# Patient Record
Sex: FEMALE | Race: WHITE | Employment: STUDENT | URBAN - METROPOLITAN AREA
[De-identification: names, ages, dates, MRNs, and addresses within clinical notes are randomized per-mention and may not be internally consistent; named-entity substitution may affect disease eponyms.]

---
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
## 2019-03-19 ENCOUNTER — TELEPHONE (OUTPATIENT)
Dept: ENDOCRINOLOGY | Facility: CLINIC | Age: 19
End: 2019-03-19

## 2019-03-19 NOTE — TELEPHONE ENCOUNTER
1st Attempt: Recall Letter sent on 03/11/2019.    2nd Attempt: LVM for Rabia to call back to schedule her routine follow up appointment with Malissa VITAL CNP.  Rabia is due to be seen in June 2019.  I asked Rabia to call [phone] to schedule this appointment.     Franky Young  Procedure , [place]  [facility]

## 2019-04-25 DIAGNOSIS — E06.3 HASHIMOTO'S DISEASE: ICD-10-CM

## 2019-04-25 RX ORDER — LEVOTHYROXINE SODIUM 100 UG/1
100 TABLET ORAL DAILY
Qty: 30 TABLET | Refills: 0 | Status: SHIPPED | OUTPATIENT
Start: 2019-04-25 | End: 2019-05-17 | Stop reason: DRUGHIGH

## 2019-04-25 NOTE — TELEPHONE ENCOUNTER
Called patient and left voicemail message to call clinic. This RN plans to discuss refill at this time and to remind patient to have thyroid labs drawn before upcoming appointment. [facility] pharmacy called and confirmed patient needed a refill of Levothyroxine. Refill was sent for 30 day supply to get patient to upcoming appointment with TAYLER Razo RN

## 2019-04-25 NOTE — TELEPHONE ENCOUNTER
levothyroxine (SYNTHROID/LEVOTHROID) 100 MCG tablet  Faxed refill request received from: [facility] [place]  Medication Requested: levothyroxine (SYNTHROID/LEVOTHROID) 100 MCG tablet  Directions: Take 1 tablet (100 mcg) by mouth daily - Oral  Quantity:90  Last Office Visit: 6/29/2018  Next Appointment Scheduled for: 5/17/2019   ATIYA Mejia

## 2019-05-17 ENCOUNTER — OFFICE VISIT (OUTPATIENT)
Dept: ENDOCRINOLOGY | Facility: CLINIC | Age: 19
End: 2019-05-17
Payer: COMMERCIAL

## 2019-05-17 VITALS
WEIGHT: 172.84 LBS | DIASTOLIC BLOOD PRESSURE: 78 MMHG | BODY MASS INDEX: 30.62 KG/M2 | SYSTOLIC BLOOD PRESSURE: 127 MMHG | HEART RATE: 85 BPM | HEIGHT: 63 IN

## 2019-05-17 DIAGNOSIS — E06.3 HASHIMOTO'S DISEASE: ICD-10-CM

## 2019-05-17 LAB
T4 FREE SERPL-MCNC: 1.12 NG/DL (ref 0.76–1.46)
TSH SERPL DL<=0.005 MIU/L-ACNC: 5.57 MU/L (ref 0.4–4)

## 2019-05-17 PROCEDURE — 84443 ASSAY THYROID STIM HORMONE: CPT | Performed by: NURSE PRACTITIONER

## 2019-05-17 PROCEDURE — 84439 ASSAY OF FREE THYROXINE: CPT | Performed by: NURSE PRACTITIONER

## 2019-05-17 PROCEDURE — 36416 COLLJ CAPILLARY BLOOD SPEC: CPT | Performed by: NURSE PRACTITIONER

## 2019-05-17 PROCEDURE — 99213 OFFICE O/P EST LOW 20 MIN: CPT | Performed by: NURSE PRACTITIONER

## 2019-05-17 RX ORDER — LEVOTHYROXINE SODIUM 100 UG/1
100 TABLET ORAL DAILY
Qty: 30 TABLET | Refills: 0 | Status: CANCELLED | OUTPATIENT
Start: 2019-05-17

## 2019-05-17 RX ORDER — LEVOTHYROXINE SODIUM 112 UG/1
112 TABLET ORAL DAILY
Qty: 30 TABLET | Refills: 11 | Status: SHIPPED | OUTPATIENT
Start: 2019-05-17 | End: 2020-05-15

## 2019-05-17 ASSESSMENT — MIFFLIN-ST. JEOR: SCORE: 1524.87

## 2019-05-17 NOTE — PROGRESS NOTES
Pediatric Endocrinology   Outpatient visit  HPI:  Rabia Jara is a 18 year old female here accompanied to clinic by her mother for follow up of hypothyroidism.    Past history is as follows: Rabia was first seen in consultation in 2012 by Dr. Salas after she was found to have elevated TSH in 2012-- initial TSH value was 97.95, free T4 0.46, and TPO of 997.32. She had symptoms for as long as 3 years prior to this. She had headaches, stomach aches and low energy and dry skin on her arms. Family history was positive for thyroid disease in father.     Rabia continues on levothyroxine at 100 mcg daily. Last dose increase at clinic visit 2018.      Missed doses since last visit:  Not having difficulties with missed doses-takes in morning.     Symptoms of hypo or hyperthyroidism:    Normal energy, no changes to skin or hair, no issues with abdominal pain, constipation.  Occasional issues waking at night.  Menses normal although now on OCPs for issues with cramping. Reporting intermittent joint pain.  Has area of psoriasis on back of neck.       I have reviewed the available past laboratory evaluations, imaging studies, and medical records available to me at this visit.  I have reviewed the patient's growth chart.    ROS:  General: Negative  Head:  Negative  Eyes:  Negative  Ears/Nose: Negative  OP/Throat:  Negative  Neck:  Negative:  CV:  Negative  Lungs:  Negative  Abdomen:  Negative  :  See HPI  Skin: Negative  MSK:  Negative  Heme/lymph: Negative  Neuro/psych: Negative  Endo:  Negative except as in HPI    PMHx:  Patient Active Problem List   Diagnosis     Hip dysplasia, congenital     Hashimoto's disease     Body mass index 85-94% for age, overweight child, prevention plus     Past Medical History:   Diagnosis Date     ALTE (apparent life threatening event) in  and infant 1 week old    Reflux     Hip dysplasia, congenital     1 more follow-up with Cirilo, but doing well      "Hypothyroidism Aug 2012    Hashimoto's thyroiditis     Past Surgical History:   Procedure Laterality Date     NO HISTORY OF SURGERY       SocHx:  Social History     Social History Narrative    Rabia lives at home with her parents () and brother Sharif.  They have 3 cats and a dog at home.  Dad smokes outside the home.  Rabia is in 11th grade (8113-3029).       FamHx:  Family History   Problem Relation Age of Onset     Hypertension Father      Cardiovascular Father      Circulatory Father         Brain Aneurysm     Thyroid Disease Father         Hypothyroidism     C.A.D. Maternal Grandfather      C.A.D. Paternal Grandfather      Endocrine Disease Paternal Grandmother         Short stature (5' 0)     Endocrine Disease Mother 14        late menarche     Asthma No family hx of      Diabetes No family hx of      Allergies Maternal Aunt        PE:  Vitals: /78 (BP Location: Left arm, Patient Position: Sitting, Cuff Size: Adult Large)   Pulse 85   Ht 1.595 m (5' 2.8\")   Wt 78.4 kg (172 lb 13.5 oz)   BMI 30.82 kg/m    BMI= Body mass index is 30.82 kg/m .  Last two heights and weights with percentiles:  Ht Readings from Last 2 Encounters:   05/17/19 1.595 m (5' 2.8\") (28 %)*   06/29/18 1.595 m (5' 2.8\") (28 %)*     * Growth percentiles are based on CDC (Girls, 2-20 Years) data.     Wt Readings from Last 2 Encounters:   05/17/19 78.4 kg (172 lb 13.5 oz) (93 %)*   06/29/18 68.2 kg (150 lb 5.7 oz) (83 %)*     * Growth percentiles are based on CDC (Girls, 2-20 Years) data.     General:  Alert, NAD.  HEENT:  NCAT. Conjunctiva clear, sclera white.  Oral mucosa without erythema or lesions.  Neck: Supple and non-tender  Thyroid:  No thyromegaly, no nodules  CV: RRR, normal S1 and S2, no murmur  Lungs: CTA b/l  Abd: soft, ND, no HSM  Skin: No rash  MSK: LEONARD  Neurologic:  Appropriate mental status for age.  Normal gross motor.  Psychiatric:  Cooperative.  Normal affect.      Results:    Results for orders placed or " performed in visit on 05/17/19   TSH   Result Value Ref Range    TSH 5.57 (H) 0.40 - 4.00 mU/L   T4 free   Result Value Ref Range    T4 Free 1.12 0.76 - 1.46 ng/dL       Assessment:  1.  Hashimoto's     Rabia is a 18 year old female with Hashimoto's that has been treated with levothyroxine.  Thyroid labs were performed today and TSH was mildly elevated with a normal Free T4.  Increase in levothyroxine to 112 mcg daily is recommended.  Follow up thyroid labs are needed in 4-6 weeks from dose change with lab only appointment.     Clinic follow up in 1 year is recommended with transition to adult endocrine.       Please refer to patient instructions for remainder of plan.      Plan:  Orders placed this encounter:  Orders Placed This Encounter   Procedures     TSH     T4 free       Patient Instructions   Thank you for choosing [facility]. It was a pleasure to see you for your office visit today.     To reach our Specialty Clinic: [phone]  To reach our Imaging scheduler: [phone]      If you had any blood work, imaging or other tests:  Normal test results will be mailed to your home address in a letter  Abnormal results will be communicated to you via phone call/letter  Please allow up to 1-2 weeks for processing/interpretation of most lab work  If you have questions or concerns call our clinic at [phone]    1.  Thyroid labs today.  I will be in contact with you when results are in and update pharmacy with refills on levothyroxine.    2.  Weight is up from last year.  Regular physical activity is encouraged.   3.  Concerns with intermittent joint pain.   I recommend discussing with your primary as a first step of evaluation.    4.  Follow up in 1 year with adult endocrine.       AMANUEL Razo, CNP  Pediatric Endocrinology  [facility]  [facility]  [phone]

## 2019-05-17 NOTE — LETTER
May 17, 2019      TO: Rabia Olivares Estefani  [address] [address]  [place] [address]         Dear Rabia,    Below are results from our endocrine visit:    Results for orders placed or performed in visit on 05/17/19   TSH   Result Value Ref Range    TSH 5.57 (H) 0.40 - 4.00 mU/L   T4 free   Result Value Ref Range    T4 Free 1.12 0.76 - 1.46 ng/dL     As I stated on my voicemail, your TSH was mildly elevated at 5.57 with a normal Free T4.  Based on higher TSH I recommend increase in your levothyroxine dosage to 112 mcg daily.  Follow up thyroid labs should be performed in 4-6 weeks from dose change.     Sincerely,    AMANUEL Razo, CNP  Pediatric Endocrinology  [facility]  [facility]  [phone]

## 2019-05-17 NOTE — NURSING NOTE
"Rabia Elise Estefani's goals for this visit include: Thyroid  She requests these members of her care team be copied on today's visit information: yes    PCP: Rhianna Miller    Referring Provider:  Rhianna Miller  [facility]  [address] [place] [address]    /78 (BP Location: Left arm, Patient Position: Sitting, Cuff Size: Adult Large)   Pulse 85   Ht 1.595 m (5' 2.8\")   Wt 78.4 kg (172 lb 13.5 oz)   BMI 30.82 kg/m      Do you need any medication refills at today's visit? No    SHAKILA Espinosa        "

## 2019-05-17 NOTE — LETTER
5/17/2019         RE: Rabia Jara  [address]  [place] [address]        Dear Colleague,    Thank you for referring your patient, Rabia Jara, to the [facility]. Please see a copy of my visit note below.    Pediatric Endocrinology   Outpatient visit  HPI:  Rabia Jara is a 18 year old female here accompanied to clinic by her mother for follow up of hypothyroidism.    Past history is as follows: Rabia was first seen in consultation in October 2012 by Dr. Salas after she was found to have elevated TSH in August 2012-- initial TSH value was 97.95, free T4 0.46, and TPO of 997.32. She had symptoms for as long as 3 years prior to this. She had headaches, stomach aches and low energy and dry skin on her arms. Family history was positive for thyroid disease in father.     Rabia continues on levothyroxine at 100 mcg daily. Last dose increase at clinic visit 6/29/2018.      Missed doses since last visit:  Not having difficulties with missed doses-takes in morning.     Symptoms of hypo or hyperthyroidism:    Normal energy, no changes to skin or hair, no issues with abdominal pain, constipation.  Occasional issues waking at night.  Menses normal although now on OCPs for issues with cramping. Reporting intermittent joint pain.  Has area of psoriasis on back of neck.       I have reviewed the available past laboratory evaluations, imaging studies, and medical records available to me at this visit.  I have reviewed the patient's growth chart.    ROS:  General: Negative  Head:  Negative  Eyes:  Negative  Ears/Nose: Negative  OP/Throat:  Negative  Neck:  Negative:  CV:  Negative  Lungs:  Negative  Abdomen:  Negative  :  See HPI  Skin: Negative  MSK:  Negative  Heme/lymph: Negative  Neuro/psych: Negative  Endo:  Negative except as in HPI    PMHx:  Patient Active Problem List   Diagnosis     Hip dysplasia, congenital     Hashimoto's disease     Body mass index 85-94% for  "age, overweight child, prevention plus     Past Medical History:   Diagnosis Date     ALTE (apparent life threatening event) in  and infant 1 week old    Reflux     Hip dysplasia, congenital     1 more follow-up with Cirilo, but doing well     Hypothyroidism Aug 2012    Hashimoto's thyroiditis     Past Surgical History:   Procedure Laterality Date     NO HISTORY OF SURGERY       SocHx:  Social History     Social History Narrative    Rabia lives at home with her parents () and brother Sharif.  They have 3 cats and a dog at home.  Dad smokes outside the home.  Rabia is in 11th grade (8631-0907).       FamHx:  Family History   Problem Relation Age of Onset     Hypertension Father      Cardiovascular Father      Circulatory Father         Brain Aneurysm     Thyroid Disease Father         Hypothyroidism     C.A.D. Maternal Grandfather      C.A.D. Paternal Grandfather      Endocrine Disease Paternal Grandmother         Short stature (5' 0)     Endocrine Disease Mother 14        late menarche     Asthma No family hx of      Diabetes No family hx of      Allergies Maternal Aunt        PE:  Vitals: /78 (BP Location: Left arm, Patient Position: Sitting, Cuff Size: Adult Large)   Pulse 85   Ht 1.595 m (5' 2.8\")   Wt 78.4 kg (172 lb 13.5 oz)   BMI 30.82 kg/m     BMI= Body mass index is 30.82 kg/m .  Last two heights and weights with percentiles:  Ht Readings from Last 2 Encounters:   19 1.595 m (5' 2.8\") (28 %)*   18 1.595 m (5' 2.8\") (28 %)*     * Growth percentiles are based on CDC (Girls, 2-20 Years) data.     Wt Readings from Last 2 Encounters:   19 78.4 kg (172 lb 13.5 oz) (93 %)*   18 68.2 kg (150 lb 5.7 oz) (83 %)*     * Growth percentiles are based on CDC (Girls, 2-20 Years) data.     General:  Alert, NAD.  HEENT:  NCAT. Conjunctiva clear, sclera white.  Oral mucosa without erythema or lesions.  Neck: Supple and non-tender  Thyroid:  No thyromegaly, no nodules  CV: " RRR, normal S1 and S2, no murmur  Lungs: CTA b/l  Abd: soft, ND, no HSM  Skin: No rash  MSK: LEONARD  Neurologic:  Appropriate mental status for age.  Normal gross motor.  Psychiatric:  Cooperative.  Normal affect.      Results:    Results for orders placed or performed in visit on 05/17/19   TSH   Result Value Ref Range    TSH 5.57 (H) 0.40 - 4.00 mU/L   T4 free   Result Value Ref Range    T4 Free 1.12 0.76 - 1.46 ng/dL       Assessment:  1.  Hashimoto's     Rabia is a 18 year old female with Hashimoto's that has been treated with levothyroxine.  Thyroid labs were performed today and TSH was mildly elevated with a normal Free T4.  Increase in levothyroxine to 112 mcg daily is recommended.  Follow up thyroid labs are needed in 4-6 weeks from dose change with lab only appointment.     Clinic follow up in 1 year is recommended with transition to adult endocrine.       Please refer to patient instructions for remainder of plan.      Plan:  Orders placed this encounter:  Orders Placed This Encounter   Procedures     TSH     T4 free       Patient Instructions   Thank you for choosing [facility]. It was a pleasure to see you for your office visit today.     To reach our Specialty Clinic: [phone]  To reach our Imaging scheduler: [phone]      If you had any blood work, imaging or other tests:  Normal test results will be mailed to your home address in a letter  Abnormal results will be communicated to you via phone call/letter  Please allow up to 1-2 weeks for processing/interpretation of most lab work  If you have questions or concerns call our clinic at [phone]    1.  Thyroid labs today.  I will be in contact with you when results are in and update pharmacy with refills on levothyroxine.    2.  Weight is up from last year.  Regular physical activity is encouraged.   3.  Concerns with intermittent joint pain.   I recommend discussing with your primary as a first step of evaluation.    4.   Follow up in 1 year with adult endocrine.       AMANUEL Razo, CNP  Pediatric Endocrinology  [facility]  [facility]  [phone]      Again, thank you for allowing me to participate in the care of your patient.        Sincerely,        AMANUEL Houston CNP

## 2019-05-17 NOTE — PATIENT INSTRUCTIONS
Thank you for choosing [facility]. It was a pleasure to see you for your office visit today.     To reach our Specialty Clinic: [phone]  To reach our Imaging scheduler: [phone]      If you had any blood work, imaging or other tests:  Normal test results will be mailed to your home address in a letter  Abnormal results will be communicated to you via phone call/letter  Please allow up to 1-2 weeks for processing/interpretation of most lab work  If you have questions or concerns call our clinic at [phone]    1.  Thyroid labs today.  I will be in contact with you when results are in and update pharmacy with refills on levothyroxine.    2.  Weight is up from last year.  Regular physical activity is encouraged.   3.  Concerns with intermittent joint pain.   I recommend discussing with your primary as a first step of evaluation.    4.  Follow up in 1 year with adult endocrine.

## 2019-05-21 ENCOUNTER — CARE COORDINATION (OUTPATIENT)
Dept: ENDOCRINOLOGY | Facility: CLINIC | Age: 19
End: 2019-05-21

## 2019-05-21 NOTE — PROGRESS NOTES
Patient mother called (JUAN on file) left voicemail message to return call to clinic regarding message from Malissa Padilla CNP. Patient also called and left voicemail message regarding results and recommendations. Letter will be sent to patient home address on file.  Tana Sun RN    Result letter recommendations per Malissa Padilla CNP:    As I stated on my voicemail, your TSH was mildly elevated at 5.57 with a normal Free T4.  Based on higher TSH I recommend increase in your levothyroxine dosage to 112 mcg daily.  Follow up thyroid labs should be performed in 4-6 weeks from dose change.          Update:  Spoke with patient regarding confirmation she received voicemail message left from Malissa Padilla CNP with dose change recommendations for Levothyroxine 112mcg daily with lab only appointment in 4-6 weeks from dose change. Patient verbally confirmed receiving message. She would like to call back to set up lab appointment after discussing with patient's mother. Will wait for callback from patient or patient's mother to assist in setting up lab only appointment;  Tana Sun RN

## 2020-05-15 DIAGNOSIS — E06.3 HASHIMOTO'S DISEASE: ICD-10-CM

## 2020-05-15 RX ORDER — LEVOTHYROXINE SODIUM 112 UG/1
112 TABLET ORAL DAILY
Qty: 30 TABLET | Refills: 1 | Status: SHIPPED | OUTPATIENT
Start: 2020-05-15 | End: 2020-07-14

## 2020-05-15 NOTE — TELEPHONE ENCOUNTER
Faxed refill request received from: [facility]  Medication Requested: levothyroxine (SYNTHROID/LEVOTHROID) 112 MCG tablet   Directions:Take 1 tablet (112 mcg) by mouth daily - Oral   Quantity:30 tablets  Last Office Visit: 05/17/2019  Next Appointment Scheduled for: 06/30/2020 with Dr. Huber  Last refill: 04/15/2020  SHAKILA Espinosa

## 2020-05-15 NOTE — TELEPHONE ENCOUNTER
Rx for levothyroxine sent to get patient to schedule appointment with adult endocrinology on 06/30/2020.   Tana Sun RN

## 2020-06-09 ENCOUNTER — TELEPHONE (OUTPATIENT)
Dept: ENDOCRINOLOGY | Facility: CLINIC | Age: 20
End: 2020-06-09

## 2020-06-09 NOTE — TELEPHONE ENCOUNTER
Patient request if provider would be willing to sign medical forms for unemployment due to COVID-19 and having an autoimmune disease. This RN will send message to Malissa Padilla CNP regarding request and contact patient with response.  Tana Sun RN

## 2020-06-17 NOTE — TELEPHONE ENCOUNTER
"Spoke with patient regarding request for letter and unemployment forms to be signed by Malissa Padilla CNP. At this time Malissa Padilla CNP will not sign these forms as CDC, endocrine society and the American thyroid association: the immune system is complex, and having autoimmune thyroid disease does not mean that a person is immunocompromised or will be unable to fight off a viral infection.     Thus far, there is no indication that patients with autoimmune thyroid disease are at greater risk of getting COVID-19 or of being more severely affected should they acquire the COVID-19 infection.     Everyone should continue to practice the recommended hand hygiene and social distancing recommendations to avoid COVID-19 infection.\"  I have to abide by current statements and recommendations from the JESSICA and CDC and I can't state that she is at higher risk with Hashimoto's hypothyroidism.  Rabia, if she had to return to work, should be allowed to wear a mask.     Patient verbalized understanding. No further questions or concerns.  Tana Sun RN    "

## 2020-07-14 DIAGNOSIS — E06.3 HASHIMOTO'S DISEASE: ICD-10-CM

## 2020-07-14 RX ORDER — LEVOTHYROXINE SODIUM 112 UG/1
112 TABLET ORAL DAILY
Qty: 30 TABLET | Refills: 0 | Status: SHIPPED | OUTPATIENT
Start: 2020-07-14 | End: 2020-08-14

## 2020-07-14 NOTE — TELEPHONE ENCOUNTER
Faxed refill request received from: [facility]   Pending Prescriptions:                       Disp   Refills    levothyroxine (SYNTHROID/LEVOTHROID) 112 *30 tab*1            Sig: Take 1 tablet (112 mcg) by mouth daily  Last Office Visit: 5/17/2019  Next Appointment Scheduled for: transitioning to adult endo-pt has an appt with Dr. Huber on 8/19/2020  Last refill: 6/15/2020  Jackie, CMA

## 2020-08-14 DIAGNOSIS — E06.3 HASHIMOTO'S DISEASE: ICD-10-CM

## 2020-08-14 RX ORDER — LEVOTHYROXINE SODIUM 112 UG/1
112 TABLET ORAL DAILY
Qty: 30 TABLET | Refills: 0 | Status: SHIPPED | OUTPATIENT
Start: 2020-08-14 | End: 2020-09-11

## 2020-08-14 NOTE — TELEPHONE ENCOUNTER
Faxed refill request received from: [facility]   Pending Prescriptions:                       Disp   Refills    levothyroxine (SYNTHROID/LEVOTHROID) 112 *30 tab*0            Sig: Take 1 tablet (112 mcg) by mouth daily  Quantity: 30  Last Office Visit: 5/17/2019  Next Appointment Scheduled for: Pt is transferring to adult endo. Pt has an appt with Dr. Huber on 8/19/2020.   Last refill: 7/14/2020  Jackie CMA

## 2020-08-14 NOTE — TELEPHONE ENCOUNTER
Per chart review, patient is transitioning to adult endocrinology on 8/19/2020 with Dr. Huber. Toyin refill x1 month provided to patient.    Marj Philippe RN  [facility]

## 2020-08-19 ENCOUNTER — OFFICE VISIT (OUTPATIENT)
Dept: ENDOCRINOLOGY | Facility: CLINIC | Age: 20
End: 2020-08-19
Payer: COMMERCIAL

## 2020-08-19 VITALS
BODY MASS INDEX: 36.27 KG/M2 | DIASTOLIC BLOOD PRESSURE: 85 MMHG | OXYGEN SATURATION: 98 % | WEIGHT: 203.4 LBS | SYSTOLIC BLOOD PRESSURE: 149 MMHG | HEART RATE: 111 BPM

## 2020-08-19 DIAGNOSIS — E06.3 HASHIMOTO'S DISEASE: Primary | ICD-10-CM

## 2020-08-19 DIAGNOSIS — R79.82 ELEVATED C-REACTIVE PROTEIN (CRP): ICD-10-CM

## 2020-08-19 DIAGNOSIS — E66.813 CLASS 3 SEVERE OBESITY DUE TO EXCESS CALORIES WITHOUT SERIOUS COMORBIDITY IN ADULT, UNSPECIFIED BMI (H): ICD-10-CM

## 2020-08-19 DIAGNOSIS — E66.01 CLASS 3 SEVERE OBESITY DUE TO EXCESS CALORIES WITHOUT SERIOUS COMORBIDITY IN ADULT, UNSPECIFIED BMI (H): ICD-10-CM

## 2020-08-19 LAB
ALBUMIN SERPL-MCNC: 3.8 G/DL (ref 3.4–5)
ALP SERPL-CCNC: 82 U/L (ref 40–150)
ALT SERPL W P-5'-P-CCNC: 16 U/L (ref 0–50)
ANION GAP SERPL CALCULATED.3IONS-SCNC: 3 MMOL/L (ref 3–14)
AST SERPL W P-5'-P-CCNC: 16 U/L (ref 0–45)
BASOPHILS # BLD AUTO: 0.1 10E9/L (ref 0–0.2)
BASOPHILS NFR BLD AUTO: 0.5 %
BILIRUB SERPL-MCNC: 0.2 MG/DL (ref 0.2–1.3)
BUN SERPL-MCNC: 9 MG/DL (ref 7–30)
CALCIUM SERPL-MCNC: 9.1 MG/DL (ref 8.5–10.1)
CHLORIDE SERPL-SCNC: 107 MMOL/L (ref 94–109)
CHOLEST SERPL-MCNC: 206 MG/DL
CO2 SERPL-SCNC: 27 MMOL/L (ref 20–32)
CREAT SERPL-MCNC: 0.61 MG/DL (ref 0.52–1.04)
CRP SERPL-MCNC: 24.4 MG/L (ref 0–8)
DIFFERENTIAL METHOD BLD: NORMAL
EOSINOPHIL # BLD AUTO: 0.2 10E9/L (ref 0–0.7)
EOSINOPHIL NFR BLD AUTO: 1.8 %
ERYTHROCYTE [DISTWIDTH] IN BLOOD BY AUTOMATED COUNT: 13.2 % (ref 10–15)
GFR SERPL CREATININE-BSD FRML MDRD: >90 ML/MIN/{1.73_M2}
GLUCOSE SERPL-MCNC: 85 MG/DL (ref 70–99)
HBA1C MFR BLD: 5.3 % (ref 0–5.6)
HCT VFR BLD AUTO: 40 % (ref 35–47)
HDLC SERPL-MCNC: 72 MG/DL
HGB BLD-MCNC: 12.9 G/DL (ref 11.7–15.7)
IMM GRANULOCYTES # BLD: 0 10E9/L (ref 0–0.4)
IMM GRANULOCYTES NFR BLD: 0.3 %
LDLC SERPL CALC-MCNC: 103 MG/DL
LYMPHOCYTES # BLD AUTO: 2.6 10E9/L (ref 0.8–5.3)
LYMPHOCYTES NFR BLD AUTO: 26.6 %
MCH RBC QN AUTO: 27.2 PG (ref 26.5–33)
MCHC RBC AUTO-ENTMCNC: 32.3 G/DL (ref 31.5–36.5)
MCV RBC AUTO: 84 FL (ref 78–100)
MONOCYTES # BLD AUTO: 0.4 10E9/L (ref 0–1.3)
MONOCYTES NFR BLD AUTO: 4.4 %
NEUTROPHILS # BLD AUTO: 6.4 10E9/L (ref 1.6–8.3)
NEUTROPHILS NFR BLD AUTO: 66.4 %
NONHDLC SERPL-MCNC: 134 MG/DL
PLATELET # BLD AUTO: 368 10E9/L (ref 150–450)
POTASSIUM SERPL-SCNC: 4.3 MMOL/L (ref 3.4–5.3)
PROT SERPL-MCNC: 8.3 G/DL (ref 6.8–8.8)
RBC # BLD AUTO: 4.74 10E12/L (ref 3.8–5.2)
SODIUM SERPL-SCNC: 137 MMOL/L (ref 133–144)
T4 FREE SERPL-MCNC: 1.22 NG/DL (ref 0.76–1.46)
TRIGL SERPL-MCNC: 157 MG/DL
TSH SERPL DL<=0.005 MIU/L-ACNC: 2.5 MU/L (ref 0.4–4)
WBC # BLD AUTO: 9.6 10E9/L (ref 4–11)

## 2020-08-19 PROCEDURE — 36415 COLL VENOUS BLD VENIPUNCTURE: CPT | Performed by: INTERNAL MEDICINE

## 2020-08-19 PROCEDURE — 86140 C-REACTIVE PROTEIN: CPT | Performed by: INTERNAL MEDICINE

## 2020-08-19 PROCEDURE — 80050 GENERAL HEALTH PANEL: CPT | Performed by: INTERNAL MEDICINE

## 2020-08-19 PROCEDURE — 80061 LIPID PANEL: CPT | Performed by: INTERNAL MEDICINE

## 2020-08-19 PROCEDURE — 84439 ASSAY OF FREE THYROXINE: CPT | Performed by: INTERNAL MEDICINE

## 2020-08-19 PROCEDURE — 83036 HEMOGLOBIN GLYCOSYLATED A1C: CPT | Performed by: INTERNAL MEDICINE

## 2020-08-19 PROCEDURE — 99204 OFFICE O/P NEW MOD 45 MIN: CPT | Performed by: INTERNAL MEDICINE

## 2020-08-19 NOTE — LETTER
8/19/2020         RE: Rabia Jara  [address] [address]        Dear Colleague,    Thank you for referring your patient, Rabia Jara, to the [facility]. Please see a copy of my visit note below.    The patient is transitioning care from pediatric endocrinology.  She was last seen in the clinic in May 2019.  HPI:  Rabia Jara is a 19 year old female.    Past history is as follows: Rabia was first seen in consultation in October 2012 by Dr. Salas after she was found to have elevated TSH in August 2012-- initial TSH value was 97.95, free T4 0.46, and TPO of 997.32. She had symptoms for as long as 3 years prior to this. She had headaches, stomach aches and low energy and dry skin on her arms. Family history was positive for thyroid disease in father.   In May 2019, the dose of levothyroxine was increased from 100 to 112 mcg daily, which she continues to take.    The patient reports being compliant in taking the levothyroxine in the morning, with water, 1 hour prior to having breakfast.  Intermittently, she has been taking multivitamins, generally 4 hours after taking levothyroxine.     She is frustrated by her inability to lose weight.   Menarche occurred at age 11 and her menstrual periods were monthly prior to starting treatment with birth control pills, in 2007, for management of abdominal cramps.  She describes being overweight as a child.  She dropped a lot of weight in high school and when she started treatment with levothyroxine but then she gained a significant amount of weight 1-1/2 years ago. Her weight is up 30 lbs since 5/2019 but stable in the last  Currently, the exercise consists of walks, on a daily basis, for 15 to 30 minutes and yoga/aerobics, for 30 to 40 minutes, 2 times a week.  She has a hard time identifying the main components of her diet.  She reports having sweets or sweetened beverages rarely, once a week or so.  In the  past, she has tried to reduce the caloric intake to 9111-9466 daily calories, with no success.    Approximately 1/2 years ago, she was evaluated by gynecology.  A pelvic ultrasound was done at that time and the recommendation was to take the birth control pills for 4 straight month.  While doing this, the patient gained weight and she did not feel good.  Subsequently, she continues to take them monthly.  Recently, her menstrual periods have been shorter, only lasting 2 days instead of 3 to 4 days.    For the last 2 to 3 years, she has been experiencing significant joint pain, mainly in the ankles, knees, hips and wrists.  She was evaluated by rheumatology and had x-rays done.  She plans to follow-up on this as the pain persists. Prior CRP levels have been elevated.       Current Outpatient Medications:      desogestrel-ethinyl estradiol (ENSKYCE) 0.15-30 MG-MCG per tablet, , Disp: , Rfl:      levothyroxine (SYNTHROID/LEVOTHROID) 112 MCG tablet, Take 1 tablet (112 mcg) by mouth daily, Disp: 30 tablet, Rfl: 0    ROS:  Systemic symptoms: no fatigue  Eye symptoms: No eye symptoms.  Otolaryngeal symptoms: No otolaryngeal symptoms.  Breast symptoms: No breast symptoms.  Cardiovascular symptoms: No cardiovascular symptoms.    Pulmonary symptoms: No pulmonary symptoms.  Gastrointestinal symptoms: No gastrointestinal symptoms.   Genitourinary symptoms: No genitourinary symptoms.  Endocrine symptoms: No endocrine symptoms.    Hematologic symptoms: No hematologic symptoms.  Musculoskeletal symptoms: as above   Neurological symptoms: headaches present more frequently (she is scheduled to have the wisdom teeth removed), now every other day; they are present around her MP periods: No muscle weakness  Psychological symptoms: she has noticed some anxiety, intermittently throughout her life  Skin symptoms: Easy bruising throughout her life    PMHx:  Past Medical History:   Diagnosis Date     ALTE (apparent life threatening event) in   and infant 1 week old    Reflux     Hip dysplasia, congenital     1 more follow-up with Cirilo, but doing well     Hypothyroidism Aug 2012    Hashimoto's thyroiditis   Psoriasis, limited now to the scalp area   Lactose intolerant     Past Surgical History:   Procedure Laterality Date     NO HISTORY OF SURGERY         SocHx:  Single. She lives with her parents. No children. She denies smoking, drinking alcohol or using illicit drugs. Occupation - used to work in retail.   Social History     Social History Narrative    Rabia lives at home with her parents () and brother Sharif.  They have 3 cats and a dog at home.  Dad smokes outside the home.  Rabia is in 11th grade (8954-2090).       FamHx:  Family History   Problem Relation Age of Onset     Hypertension Father      Cardiovascular Father      Circulatory Father         Brain Aneurysm     Thyroid Disease Father         Hypothyroidism     C.A.D. Maternal Grandfather      C.A.D. Paternal Grandfather      Endocrine Disease Paternal Grandmother         Short stature (5' 0)     Endocrine Disease Mother 14        late menarche     Asthma No family hx of      Diabetes No family hx of      Allergies Maternal Aunt    Family history of obesity on paternal side of the family. Great grandmother - ovarian cancer.     PE:  Wt Readings from Last 10 Encounters:   19 92.3 kg (203 lb 6.4 oz)   19 78.4 kg (172 lb 13.5 oz) (93 %, Z= 1.45)*   18 68.2 kg (150 lb 5.7 oz) (83 %, Z= 0.94)*   17 67.4 kg (148 lb 9.4 oz) (84 %, Z= 0.98)*   16 66.9 kg (147 lb 7.8 oz) (84 %, Z= 0.99)*   06/10/16 69.2 kg (152 lb 8.9 oz) (88 %, Z= 1.16)*   12/29/15 72.4 kg (159 lb 9.8 oz) (91 %, Z= 1.37)*   06/05/15 66.3 kg (146 lb 2.6 oz) (86 %, Z= 1.07)*   14 68.4 kg (150 lb 12.7 oz) (89 %, Z= 1.24)*   14 66 kg (145 lb 8 oz) (88 %, Z= 1.17)*     * Growth percentiles are based on CDC (Girls, 2-20 Years) data.     General appearance: she is  well-developed and in no distress.  General obesity.  Eyes: conjunctivae and extraocular motions are normal. Pupils are equal, round, and reactive to light. No lid lag, no stare.  HENT: oropharynx clear and moist; neck no JVD, no bruits, no thyromegaly, no palpable nodules  Cardiovascular: regular rhythm, no murmurs, distal pulses palpable, no edema  Respiratory: chest clear, no rales, no rhonchi  Gastrointestinal: abdomen soft, nontender, nondistended, +BS, no organomegaly  Musculoskeletal: normal tone and strength   Neurologic: reflexes normal and symmetric, no resting tremor  Psychiatric: affect and judgment normal   Skin: Purplish looking abdominal striae    Results:  TSH   Date Value Ref Range Status   05/17/2019 5.57 (H) 0.40 - 4.00 mU/L Final     T4 Free   Date Value Ref Range Status   05/17/2019 1.12 0.76 - 1.46 ng/dL Final     Assessment:    Rabia is a 19 year old female seen in follow-up for Hashimoto thyroiditis.    1.  Hashimoto thyroiditis.  Follow-up TFTs and adjust the dose of levothyroxine accordingly.    2.  Obesity  Counseled on the importance of diet and exercise.  I recommended to try to restrict caloric intake to 1400 solitraio.  Counseled on the importance of restricting processed foods, sweetened beverages.  Follow-up A1c, lipid panel and CMP    3.  Joint pain  Follow-up CRP.  The patient is planning to consult rheumatology.    Orders Placed This Encounter   Procedures     Lipid Profile     Hemoglobin A1c     TSH     T4 free     Comprehensive metabolic panel     CBC with platelets differential     CRP inflammation       Again, thank you for allowing me to participate in the care of your patient.        Sincerely,        Chelsea Huber MD

## 2020-08-19 NOTE — NURSING NOTE
Rabia Jara's goals for this visit include:   Chief Complaint   Patient presents with     Establish Care     Thyroid Disease     Hashimotos     She requests these members of her care team be copied on today's visit information: No    PCP: Alejo Rivers    Referring Provider:  No referring provider defined for this encounter.    BP (!) 149/85 (BP Location: Left arm, Patient Position: Sitting, Cuff Size: Adult Large)   Pulse 111   Wt 92.3 kg (203 lb 6.4 oz)   SpO2 98%   BMI 36.27 kg/m      Do you need any medication refills at today's visit? Yes

## 2020-08-19 NOTE — PROGRESS NOTES
The patient is transitioning care from pediatric endocrinology.  She was last seen in the clinic in May 2019.  HPI:  Rabia Jara is a 19 year old female.    Past history is as follows: aRbia was first seen in consultation in October 2012 by Dr. Salas after she was found to have elevated TSH in August 2012-- initial TSH value was 97.95, free T4 0.46, and TPO of 997.32. She had symptoms for as long as 3 years prior to this. She had headaches, stomach aches and low energy and dry skin on her arms. Family history was positive for thyroid disease in father.   In May 2019, the dose of levothyroxine was increased from 100 to 112 mcg daily, which she continues to take.    The patient reports being compliant in taking the levothyroxine in the morning, with water, 1 hour prior to having breakfast.  Intermittently, she has been taking multivitamins, generally 4 hours after taking levothyroxine.     She is frustrated by her inability to lose weight.   Menarche occurred at age 11 and her menstrual periods were monthly prior to starting treatment with birth control pills, in 2007, for management of abdominal cramps.  She describes being overweight as a child.  She dropped a lot of weight in high school and when she started treatment with levothyroxine but then she gained a significant amount of weight 1-1/2 years ago. Her weight is up 30 lbs since 5/2019 but stable in the last  Currently, the exercise consists of walks, on a daily basis, for 15 to 30 minutes and yoga/aerobics, for 30 to 40 minutes, 2 times a week.  She has a hard time identifying the main components of her diet.  She reports having sweets or sweetened beverages rarely, once a week or so.  In the past, she has tried to reduce the caloric intake to 1221-4245 daily calories, with no success.    Approximately 1/2 years ago, she was evaluated by gynecology.  A pelvic ultrasound was done at that time and the recommendation was to take the birth control pills  for 4 straight month.  While doing this, the patient gained weight and she did not feel good.  Subsequently, she continues to take them monthly.  Recently, her menstrual periods have been shorter, only lasting 2 days instead of 3 to 4 days.    For the last 2 to 3 years, she has been experiencing significant joint pain, mainly in the ankles, knees, hips and wrists.  She was evaluated by rheumatology and had x-rays done.  She plans to follow-up on this as the pain persists. Prior CRP levels have been elevated.       Current Outpatient Medications:      desogestrel-ethinyl estradiol (ENSKYCE) 0.15-30 MG-MCG per tablet, , Disp: , Rfl:      levothyroxine (SYNTHROID/LEVOTHROID) 112 MCG tablet, Take 1 tablet (112 mcg) by mouth daily, Disp: 30 tablet, Rfl: 0    ROS:  Systemic symptoms: no fatigue  Eye symptoms: No eye symptoms.  Otolaryngeal symptoms: No otolaryngeal symptoms.  Breast symptoms: No breast symptoms.  Cardiovascular symptoms: No cardiovascular symptoms.    Pulmonary symptoms: No pulmonary symptoms.  Gastrointestinal symptoms: No gastrointestinal symptoms.   Genitourinary symptoms: No genitourinary symptoms.  Endocrine symptoms: No endocrine symptoms.    Hematologic symptoms: No hematologic symptoms.  Musculoskeletal symptoms: as above   Neurological symptoms: headaches present more frequently (she is scheduled to have the wisdom teeth removed), now every other day; they are present around her MP periods: No muscle weakness  Psychological symptoms: she has noticed some anxiety, intermittently throughout her life  Skin symptoms: Easy bruising throughout her life    PMHx:  Past Medical History:   Diagnosis Date     ALTE (apparent life threatening event) in  and infant 1 week old    Reflux     Hip dysplasia, congenital     1 more follow-up with Cirilo, but doing well     Hypothyroidism Aug 2012    Hashimoto's thyroiditis   Psoriasis, limited now to the scalp area   Lactose intolerant     Past  Surgical History:   Procedure Laterality Date     NO HISTORY OF SURGERY         SocHx:  Single. She lives with her parents. No children. She denies smoking, drinking alcohol or using illicit drugs. Occupation - used to work in retail.   Social History     Social History Narrative    Rabia lives at home with her parents () and brother Sharif.  They have 3 cats and a dog at home.  Dad smokes outside the home.  Rabia is in 11th grade ([id number]).       FamHx:  Family History   Problem Relation Age of Onset     Hypertension Father      Cardiovascular Father      Circulatory Father         Brain Aneurysm     Thyroid Disease Father         Hypothyroidism     C.A.D. Maternal Grandfather      C.A.D. Paternal Grandfather      Endocrine Disease Paternal Grandmother         Short stature (5' 0)     Endocrine Disease Mother 14        late menarche     Asthma No family hx of      Diabetes No family hx of      Allergies Maternal Aunt    Family history of obesity on paternal side of the family. Great grandmother - ovarian cancer.     PE:  Wt Readings from Last 10 Encounters:   08/19/20 92.3 kg (203 lb 6.4 oz)   05/17/19 78.4 kg (172 lb 13.5 oz) (93 %, Z= 1.45)*   06/29/18 68.2 kg (150 lb 5.7 oz) (83 %, Z= 0.94)*   06/02/17 67.4 kg (148 lb 9.4 oz) (84 %, Z= 0.98)*   12/09/16 66.9 kg (147 lb 7.8 oz) (84 %, Z= 0.99)*   06/10/16 69.2 kg (152 lb 8.9 oz) (88 %, Z= 1.16)*   12/29/15 72.4 kg (159 lb 9.8 oz) (91 %, Z= 1.37)*   06/05/15 66.3 kg (146 lb 2.6 oz) (86 %, Z= 1.07)*   12/26/14 68.4 kg (150 lb 12.7 oz) (89 %, Z= 1.24)*   06/20/14 66 kg (145 lb 8 oz) (88 %, Z= 1.17)*     * Growth percentiles are based on [facility] (Girls, 2-20 Years) data.     General appearance: she is well-developed and in no distress.  General obesity.  Eyes: conjunctivae and extraocular motions are normal. Pupils are equal, round, and reactive to light. No lid lag, no stare.  HENT: oropharynx clear and moist; neck no JVD, no bruits, no thyromegaly, no palpable  nodules  Cardiovascular: regular rhythm, no murmurs, distal pulses palpable, no edema  Respiratory: chest clear, no rales, no rhonchi  Gastrointestinal: abdomen soft, nontender, nondistended, +BS, no organomegaly  Musculoskeletal: normal tone and strength   Neurologic: reflexes normal and symmetric, no resting tremor  Psychiatric: affect and judgment normal   Skin: Purplish looking abdominal striae    Results:  TSH   Date Value Ref Range Status   05/17/2019 5.57 (H) 0.40 - 4.00 mU/L Final     T4 Free   Date Value Ref Range Status   05/17/2019 1.12 0.76 - 1.46 ng/dL Final     Assessment:    Rabia is a 19 year old female seen in follow-up for Hashimoto thyroiditis.    1.  Hashimoto thyroiditis.  Follow-up TFTs and adjust the dose of levothyroxine accordingly.    2.  Obesity  Counseled on the importance of diet and exercise.  I recommended to try to restrict caloric intake to 1400 solitario.  Counseled on the importance of restricting processed foods, sweetened beverages.  Follow-up A1c, lipid panel and CMP    3.  Joint pain  Follow-up CRP.  The patient is planning to consult rheumatology.    Orders Placed This Encounter   Procedures     Lipid Profile     Hemoglobin A1c     TSH     T4 free     Comprehensive metabolic panel     CBC with platelets differential     CRP inflammation

## 2020-09-11 DIAGNOSIS — E06.3 HASHIMOTO'S DISEASE: ICD-10-CM

## 2020-09-11 RX ORDER — LEVOTHYROXINE SODIUM 112 UG/1
112 TABLET ORAL DAILY
Qty: 90 TABLET | Refills: 3 | Status: SHIPPED | OUTPATIENT
Start: 2020-09-11 | End: 2021-08-17

## 2020-09-11 NOTE — TELEPHONE ENCOUNTER
Faxed refill request received from [facility]  Medication Requested: levothyroxine 112 mcg daily      Last Office Visit: Consult was on 8/19/20 with Dr. Huber   Next Appointment Scheduled for: 8/17/21      Will send to Dr. Huber to sign for first prescription under Dr. Huber. Previously under Peds Endocrine.      Jeniffer Cleaning RN  Endocrine Care Coordinator  [facility]

## 2021-08-16 ENCOUNTER — TELEPHONE (OUTPATIENT)
Dept: ENDOCRINOLOGY | Facility: CLINIC | Age: 21
End: 2021-08-16

## 2021-08-16 NOTE — TELEPHONE ENCOUNTER
Left message with appointment reminder.    Marcela Obrien [facility]  Adult Endocrinology  [facility]

## 2021-08-17 ENCOUNTER — OFFICE VISIT (OUTPATIENT)
Dept: ENDOCRINOLOGY | Facility: CLINIC | Age: 21
End: 2021-08-17
Payer: COMMERCIAL

## 2021-08-17 VITALS
HEART RATE: 103 BPM | OXYGEN SATURATION: 98 % | DIASTOLIC BLOOD PRESSURE: 88 MMHG | SYSTOLIC BLOOD PRESSURE: 134 MMHG | WEIGHT: 219 LBS | BODY MASS INDEX: 39.05 KG/M2

## 2021-08-17 DIAGNOSIS — R19.7 DIARRHEA, UNSPECIFIED TYPE: ICD-10-CM

## 2021-08-17 DIAGNOSIS — R03.0 ELEVATED BLOOD PRESSURE READING WITHOUT DIAGNOSIS OF HYPERTENSION: ICD-10-CM

## 2021-08-17 DIAGNOSIS — E06.3 HYPOTHYROIDISM DUE TO HASHIMOTO'S THYROIDITIS: Primary | ICD-10-CM

## 2021-08-17 DIAGNOSIS — E06.3 HASHIMOTO'S DISEASE: ICD-10-CM

## 2021-08-17 LAB
ANION GAP SERPL CALCULATED.3IONS-SCNC: 7 MMOL/L (ref 3–14)
BUN SERPL-MCNC: 8 MG/DL (ref 7–30)
CALCIUM SERPL-MCNC: 9.5 MG/DL (ref 8.5–10.1)
CHLORIDE BLD-SCNC: 108 MMOL/L (ref 94–109)
CO2 SERPL-SCNC: 25 MMOL/L (ref 20–32)
CREAT SERPL-MCNC: 0.72 MG/DL (ref 0.52–1.04)
GFR SERPL CREATININE-BSD FRML MDRD: >90 ML/MIN/1.73M2
GLUCOSE BLD-MCNC: 104 MG/DL (ref 70–99)
HBA1C MFR BLD: 5.4 % (ref 0–5.6)
POTASSIUM BLD-SCNC: 4.2 MMOL/L (ref 3.4–5.3)
SODIUM SERPL-SCNC: 140 MMOL/L (ref 133–144)
T4 FREE SERPL-MCNC: 1.21 NG/DL (ref 0.76–1.46)
TSH SERPL DL<=0.005 MIU/L-ACNC: 4.07 MU/L (ref 0.4–4)

## 2021-08-17 PROCEDURE — 80048 BASIC METABOLIC PNL TOTAL CA: CPT | Performed by: INTERNAL MEDICINE

## 2021-08-17 PROCEDURE — 83516 IMMUNOASSAY NONANTIBODY: CPT | Performed by: INTERNAL MEDICINE

## 2021-08-17 PROCEDURE — 83036 HEMOGLOBIN GLYCOSYLATED A1C: CPT | Performed by: INTERNAL MEDICINE

## 2021-08-17 PROCEDURE — 99214 OFFICE O/P EST MOD 30 MIN: CPT | Performed by: INTERNAL MEDICINE

## 2021-08-17 PROCEDURE — 36415 COLL VENOUS BLD VENIPUNCTURE: CPT | Performed by: INTERNAL MEDICINE

## 2021-08-17 PROCEDURE — 84439 ASSAY OF FREE THYROXINE: CPT | Performed by: INTERNAL MEDICINE

## 2021-08-17 PROCEDURE — 84443 ASSAY THYROID STIM HORMONE: CPT | Performed by: INTERNAL MEDICINE

## 2021-08-17 RX ORDER — RIZATRIPTAN BENZOATE 10 MG/1
TABLET ORAL
COMMUNITY
Start: 2021-07-14

## 2021-08-17 RX ORDER — LEVOTHYROXINE SODIUM 125 UG/1
125 TABLET ORAL DAILY
Qty: 90 TABLET | Refills: 3 | Status: SHIPPED | OUTPATIENT
Start: 2021-08-17

## 2021-08-17 RX ORDER — LEVOTHYROXINE SODIUM 112 UG/1
112 TABLET ORAL DAILY
Qty: 90 TABLET | Refills: 3 | Status: CANCELLED | OUTPATIENT
Start: 2021-08-17

## 2021-08-17 NOTE — LETTER
8/17/2021         RE: Rabia Jara  [address]  [place] [address]        Dear Colleague,    Thank you for referring your patient, Rabia Jara, to the [facility]. Please see a copy of my visit note below.    Endocrinology Clinic Visit    Chief Complaint: Thyroid Problem (1 year follow up Hashimotos, concerned about TSH high earlier this summer)     Information obtained from:Patient      Assessment/Treatment Plan:    Hypothyroidism secondary to Hashimoto thyroiditis-TSH 4.07 today.  Increase levothyroxine dose to 125 mcg daily from the current dose of 112 mcg daily.  New prescription sent to pharmacy.  Recheck lab in 3-4 months. Levothyroxine should be taken on empty stomach and wait at least 30 minutes before eating. Don't take supplements or multivitamins containing iron and calcium within 4 hours of taking levothyroxine tablet.     Elevated blood pressure reading without diagnosis of hypertension: Initial blood pressure systolic in the 140s with a diastolic blood pressure in the 90s.  This was repeated after few minutes and repeat blood pressure was 134/88.  Patient reports that she is super anxious when she comes to the doctor's office therefore this could be whitecoat hypertension.  Please check home blood pressure and to contact our clinic if it is above 130/80 persistently.    Unspecified diarrhea: Increased frequency of bowel movements on and off over the last several years.  Over the last 1 week its been persistently loose bowels.  She has lactose intolerance.  Monitor if there is any consistent association between food intake and change in bowel habits.  No fever chills or any other findings suggestive of infectious causes.  No travel history and nonbloody diarrhea.  Because of longstanding nature and a history of autoimmune disease we will also screen her for gluten sensitivity or celiac disease with TTG which was ordered today.    Preventive  healthcare-   Diabetes screening done today which was within the normal limits..  Body mass index is 39.05 kg/m .  Obesity with BMI of 39 -weight 150 pounds in 2018 significantly increased.weight to 172 in 2019 and further increase now in 2021 to 219 pounds.  Patient stated that she does not want to address this today. She has a PCP she plans to follow up with.  Consider screening with 24-hour urine cortisol for rare possibility of Cushing syndrome.        Patient Instructions   Rabia,    Levothyroxine should be taken on empty stomach and wait at least 30 minutes before eating. Don't take supplements or multivitamins containing iron and calcium within 4 hours of taking levothyroxine tablet.         Return to clinic in 12 months.    Test and/or medications prescribed today:  Orders Placed This Encounter   Procedures     TSH with free T4 reflex     Hemoglobin A1c     Basic metabolic panel     Tissue transglutaminase sol IgA and IgG     T4 free         Brannon Davies MD  Staff Endocrinologist    Division of Endocrinology and Diabetes      Subjective:         HPI: Rabia Jara is a 20 year old female who is here for routine follow-up. Last follow up with Dr. Huber one year ago.     Past medical history of hypothyroidism secondary to Hashimoto thyroiditis. Current dose of levothyroxine 112 mcg daily. Takes it on empty stomach and with at least 30 minutes before eating. No calcium or iron intake within 4 hours of taking levothyroxine tablet.    Blood pressure elevated today however no history of elevated blood pressure.    Reports loose stools which are longstanding and intermittent. Over the last 1 week she has had diarrhea which was nonbloody. No fever or chills. No recent travel.    Weight has increased since her last visit. She does not want this issue to be addressed today.     No Known Allergies    Current Outpatient Medications   Medication Sig Dispense Refill     desogestrel-ethinyl estradiol  (ENSKYCE) 0.15-30 MG-MCG per tablet        levothyroxine (SYNTHROID/LEVOTHROID) 112 MCG tablet Take 1 tablet (112 mcg) by mouth daily 90 tablet 3     rizatriptan (MAXALT) 10 MG tablet TAKE ONE TABLET BY MOUTH AT ONSET OF MIGRAINE. IF SYMPTOMS PERSIST A SECOND DOSE MAY BE TAKEN IN 2 HOURS. DO NOT EXCEED 2 DOSES IN A 24 HOUR PERIOD UNLESS OTHERWISE INSTRUCTED BY YOUR PHYSICIAN. MAX DOSE 30MG PER 24HRS         Review of Systems     as per HPI above        Objective:   /88 (BP Location: Left arm, Patient Position: Sitting, Cuff Size: Adult Large)   Pulse 103   Wt 99.3 kg (219 lb)   LMP 08/12/2021 (Approximate)   SpO2 98%   BMI 39.05 kg/m      Constitutional: Pleasant no acute cardiopulmonary distress.   EYES: anicteric, normal extra-ocular movements, no lid lag or retraction, is equal and reactive to light bilaterally.  HEENT: Mouth/Throat: Mucous membrane is moist. Oropharynx is clear.  Thyroid examination: no thyromegaly .   Cardiovascular: RRR, S1, S2 normal.   Pulmonary/Chest: CTAB. No wheezing or rales.   Abdominal: +BS. Non tender to palpation.  Stretch marks: pink stretch marks' about 0.5 cm in diameter.   Neurological: Alert and oriented.  No tremor and reflexes are symmetrical bilaterally and within the normal limits. Muscle strength 5/5.   Extremities: No edema.  Psychological: appropriate mood and affect     In House Labs:   Lab Results   Component Value Date    A1C 5.4 08/17/2021    A1C 5.3 08/19/2020    A1C 5.6 12/26/2014       TSH   Date Value Ref Range Status   08/17/2021 4.07 (H) 0.40 - 4.00 mU/L Final   08/19/2020 2.50 0.40 - 4.00 mU/L Final   05/17/2019 5.57 (H) 0.40 - 4.00 mU/L Final   06/29/2018 12.18 (H) 0.40 - 4.00 mU/L Final   12/12/2017 0.40 0.40 - 4.00 mU/L Final   06/02/2017 1.17 0.40 - 4.00 mU/L Final     T4 Free   Date Value Ref Range Status   08/19/2020 1.22 0.76 - 1.46 ng/dL Final   05/17/2019 1.12 0.76 - 1.46 ng/dL Final   06/29/2018 1.14 0.76 - 1.46 ng/dL Final   12/12/2017  1.15 0.76 - 1.46 ng/dL Final   06/02/2017 1.20 0.76 - 1.46 ng/dL Final     Free T4   Date Value Ref Range Status   08/17/2021 1.21 0.76 - 1.46 ng/dL Final       Creatinine   Date Value Ref Range Status   08/17/2021 0.72 0.52 - 1.04 mg/dL Final   08/19/2020 0.61 0.52 - 1.04 mg/dL Final   ]    Recent Labs   Lab Test 08/19/20  1504 12/09/16  0907 12/26/14  1120   CHOL 206* 140 146   HDL 72 64 59   * 63 64   TRIG 157* 65 116   CHOLHDLRATIO  --   --  2.5     38 minutes spent on the date of the encounter doing chart review, history and exam, documentation and further activities per the note.  This note has been dictated using voice recognition software.  As a result, there may be errors in the documentation that have gone undetected.  Please consider this when interpreting information in this documentation.          Again, thank you for allowing me to participate in the care of your patient.        Sincerely,        Brannon Davies MD

## 2021-08-17 NOTE — PATIENT INSTRUCTIONS
Rabia,    Levothyroxine should be taken on empty stomach and wait at least 30 minutes before eating. Don't take supplements or multivitamins containing iron and calcium within 4 hours of taking levothyroxine tablet.

## 2021-08-17 NOTE — NURSING NOTE
Rabia Jara's goals for this visit include:   Chief Complaint   Patient presents with     Thyroid Problem     1 year follow up Hashimotos, concerned about TSH high earlier this summer       She requests these members of her care team be copied on today's visit information: Yes    PCP: Patricia Christianson    Referring Provider:  Patient unable to recall    /88 (BP Location: Left arm, Patient Position: Sitting, Cuff Size: Adult Large)   Pulse 103   Wt 99.3 kg (219 lb)   LMP 08/12/2021 (Approximate)   SpO2 98%   BMI 39.05 kg/m      Do you need any medication refills at today's visit? Yes    Marcela Obrien CMA  Adult Endocrinology  [facility]

## 2021-08-18 LAB
TTG IGA SER-ACNC: <0.2 U/ML
TTG IGG SER-ACNC: <0.6 U/ML

## 2021-08-18 NOTE — PROGRESS NOTES
Endocrinology Clinic Visit    Chief Complaint: Thyroid Problem (1 year follow up Hashyahaira, concerned about TSH high earlier this summer)     Information obtained from:Patient      Assessment/Treatment Plan:    Hypothyroidism secondary to Hashimoto thyroiditis-TSH 4.07 today.  Increase levothyroxine dose to 125 mcg daily from the current dose of 112 mcg daily.  New prescription sent to pharmacy.  Recheck lab in 3-4 months. Levothyroxine should be taken on empty stomach and wait at least 30 minutes before eating. Don't take supplements or multivitamins containing iron and calcium within 4 hours of taking levothyroxine tablet.     Elevated blood pressure reading without diagnosis of hypertension: Initial blood pressure systolic in the 140s with a diastolic blood pressure in the 90s.  This was repeated after few minutes and repeat blood pressure was 134/88.  Patient reports that she is super anxious when she comes to the doctor's office therefore this could be whitecoat hypertension.  Please check home blood pressure and to contact our clinic if it is above 130/80 persistently.    Unspecified diarrhea: Increased frequency of bowel movements on and off over the last several years.  Over the last 1 week its been persistently loose bowels.  She has lactose intolerance.  Monitor if there is any consistent association between food intake and change in bowel habits.  No fever chills or any other findings suggestive of infectious causes.  No travel history and nonbloody diarrhea.  Because of longstanding nature and a history of autoimmune disease we will also screen her for gluten sensitivity or celiac disease with TTG which was ordered today.    Preventive healthcare-   Diabetes screening done today which was within the normal limits..  Body mass index is 39.05 kg/m .  Obesity with BMI of 39 -weight 150 pounds in 2018 significantly increased.weight to 172 in 2019 and further increase now in 2021 to 219 pounds.  Patient  stated that she does not want to address this today. She has a PCP she plans to follow up with.  Consider screening with 24-hour urine cortisol for rare possibility of Cushing syndrome.        Patient Instructions   Rabia,    Levothyroxine should be taken on empty stomach and wait at least 30 minutes before eating. Don't take supplements or multivitamins containing iron and calcium within 4 hours of taking levothyroxine tablet.         Return to clinic in 12 months.    Test and/or medications prescribed today:  Orders Placed This Encounter   Procedures     TSH with free T4 reflex     Hemoglobin A1c     Basic metabolic panel     Tissue transglutaminase sol IgA and IgG     T4 free         Brannon Davies MD  Staff Endocrinologist    Division of Endocrinology and Diabetes      Subjective:         HPI: Rabia Jara is a 20 year old female who is here for routine follow-up. Last follow up with Dr. Huber one year ago.     Past medical history of hypothyroidism secondary to Hashimoto thyroiditis. Current dose of levothyroxine 112 mcg daily. Takes it on empty stomach and with at least 30 minutes before eating. No calcium or iron intake within 4 hours of taking levothyroxine tablet.    Blood pressure elevated today however no history of elevated blood pressure.    Reports loose stools which are longstanding and intermittent. Over the last 1 week she has had diarrhea which was nonbloody. No fever or chills. No recent travel.    Weight has increased since her last visit. She does not want this issue to be addressed today.     No Known Allergies    Current Outpatient Medications   Medication Sig Dispense Refill     desogestrel-ethinyl estradiol (ENSKYCE) 0.15-30 MG-MCG per tablet        levothyroxine (SYNTHROID/LEVOTHROID) 112 MCG tablet Take 1 tablet (112 mcg) by mouth daily 90 tablet 3     rizatriptan (MAXALT) 10 MG tablet TAKE ONE TABLET BY MOUTH AT ONSET OF MIGRAINE. IF SYMPTOMS PERSIST A SECOND DOSE MAY BE  TAKEN IN 2 HOURS. DO NOT EXCEED 2 DOSES IN A 24 HOUR PERIOD UNLESS OTHERWISE INSTRUCTED BY YOUR PHYSICIAN. MAX DOSE 30MG PER 24HRS         Review of Systems     as per HPI above        Objective:   /88 (BP Location: Left arm, Patient Position: Sitting, Cuff Size: Adult Large)   Pulse 103   Wt 99.3 kg (219 lb)   LMP 08/12/2021 (Approximate)   SpO2 98%   BMI 39.05 kg/m      Constitutional: Pleasant no acute cardiopulmonary distress.   EYES: anicteric, normal extra-ocular movements, no lid lag or retraction, is equal and reactive to light bilaterally.  HEENT: Mouth/Throat: Mucous membrane is moist. Oropharynx is clear.  Thyroid examination: no thyromegaly .   Cardiovascular: RRR, S1, S2 normal.   Pulmonary/Chest: CTAB. No wheezing or rales.   Abdominal: +BS. Non tender to palpation.  Stretch marks: pink stretch marks' about 0.5 cm in diameter.   Neurological: Alert and oriented.  No tremor and reflexes are symmetrical bilaterally and within the normal limits. Muscle strength 5/5.   Extremities: No edema.  Psychological: appropriate mood and affect     In House Labs:   Lab Results   Component Value Date    A1C 5.4 08/17/2021    A1C 5.3 08/19/2020    A1C 5.6 12/26/2014       TSH   Date Value Ref Range Status   08/17/2021 4.07 (H) 0.40 - 4.00 mU/L Final   08/19/2020 2.50 0.40 - 4.00 mU/L Final   05/17/2019 5.57 (H) 0.40 - 4.00 mU/L Final   06/29/2018 12.18 (H) 0.40 - 4.00 mU/L Final   12/12/2017 0.40 0.40 - 4.00 mU/L Final   06/02/2017 1.17 0.40 - 4.00 mU/L Final     T4 Free   Date Value Ref Range Status   08/19/2020 1.22 0.76 - 1.46 ng/dL Final   05/17/2019 1.12 0.76 - 1.46 ng/dL Final   06/29/2018 1.14 0.76 - 1.46 ng/dL Final   12/12/2017 1.15 0.76 - 1.46 ng/dL Final   06/02/2017 1.20 0.76 - 1.46 ng/dL Final     Free T4   Date Value Ref Range Status   08/17/2021 1.21 0.76 - 1.46 ng/dL Final       Creatinine   Date Value Ref Range Status   08/17/2021 0.72 0.52 - 1.04 mg/dL Final   08/19/2020 0.61 0.52 - 1.04  mg/dL Final   ]    Recent Labs   Lab Test 08/19/20  1504 12/09/16  0907 12/26/14  1120   CHOL 206* 140 146   HDL 72 64 59   * 63 64   TRIG 157* 65 116   CHOLHDLRATIO  --   --  2.5     38 minutes spent on the date of the encounter doing chart review, history and exam, documentation and further activities per the note.  This note has been dictated using voice recognition software.  As a result, there may be errors in the documentation that have gone undetected.  Please consider this when interpreting information in this documentation.

## 2021-08-19 NOTE — RESULT ENCOUNTER NOTE
Rabia,  #1 the screening test for celiac disease or gluten sensitivity is within the normal limits.2.  The thyroid blood work results indicate that we need to increase the dose of levothyroxine.  Please take levothyroxine 125 mcg daily.  New prescription sent to your pharmacy. Levothyroxine should be taken on empty stomach and wait at least 30 minutes before eating. Don't take supplements or multivitamins containing iron and calcium within 4 hours of taking levothyroxine tablet.   #3 electrolytes and kidney function results are within normal limits.  Blood glucose was 104 and for nonfasting blood sugar this is within the normal limits.4.  Hemoglobin A1c or diabetes for screening is within the normal limits.    Please let me know if any questions regarding these results.    Brannon Davies MD

## 2021-09-12 ENCOUNTER — HEALTH MAINTENANCE LETTER (OUTPATIENT)
Age: 21
End: 2021-09-12

## 2022-11-19 ENCOUNTER — HEALTH MAINTENANCE LETTER (OUTPATIENT)
Age: 22
End: 2022-11-19

## 2024-01-28 ENCOUNTER — HEALTH MAINTENANCE LETTER (OUTPATIENT)
Age: 24
End: 2024-01-28